# Patient Record
Sex: FEMALE | Race: WHITE | NOT HISPANIC OR LATINO | URBAN - METROPOLITAN AREA
[De-identification: names, ages, dates, MRNs, and addresses within clinical notes are randomized per-mention and may not be internally consistent; named-entity substitution may affect disease eponyms.]

---

## 2022-06-22 ENCOUNTER — ON CAMPUS - OUTPATIENT (OUTPATIENT)
Dept: URBAN - METROPOLITAN AREA HOSPITAL 2 | Facility: HOSPITAL | Age: 62
End: 2022-06-22
Payer: OTHER GOVERNMENT

## 2022-06-22 ENCOUNTER — OFFICE (OUTPATIENT)
Dept: URBAN - METROPOLITAN AREA PATHOLOGY 4 | Facility: PATHOLOGY | Age: 62
End: 2022-06-22
Payer: OTHER GOVERNMENT

## 2022-06-22 VITALS
RESPIRATION RATE: 18 BRPM | WEIGHT: 178.4 LBS | SYSTOLIC BLOOD PRESSURE: 103 MMHG | SYSTOLIC BLOOD PRESSURE: 124 MMHG | SYSTOLIC BLOOD PRESSURE: 118 MMHG | HEART RATE: 91 BPM | SYSTOLIC BLOOD PRESSURE: 127 MMHG | HEART RATE: 94 BPM | DIASTOLIC BLOOD PRESSURE: 108 MMHG | DIASTOLIC BLOOD PRESSURE: 78 MMHG | HEART RATE: 96 BPM | SYSTOLIC BLOOD PRESSURE: 151 MMHG | SYSTOLIC BLOOD PRESSURE: 155 MMHG | TEMPERATURE: 97.6 F | RESPIRATION RATE: 16 BRPM | HEART RATE: 99 BPM | OXYGEN SATURATION: 99 % | OXYGEN SATURATION: 95 % | DIASTOLIC BLOOD PRESSURE: 48 MMHG | DIASTOLIC BLOOD PRESSURE: 67 MMHG | HEART RATE: 85 BPM | HEART RATE: 114 BPM | DIASTOLIC BLOOD PRESSURE: 96 MMHG | HEART RATE: 86 BPM | OXYGEN SATURATION: 97 % | DIASTOLIC BLOOD PRESSURE: 52 MMHG | HEIGHT: 66 IN | OXYGEN SATURATION: 91 % | HEART RATE: 87 BPM | OXYGEN SATURATION: 93 % | SYSTOLIC BLOOD PRESSURE: 147 MMHG | HEART RATE: 95 BPM | SYSTOLIC BLOOD PRESSURE: 133 MMHG | DIASTOLIC BLOOD PRESSURE: 93 MMHG | DIASTOLIC BLOOD PRESSURE: 72 MMHG | DIASTOLIC BLOOD PRESSURE: 95 MMHG | OXYGEN SATURATION: 96 %

## 2022-06-22 DIAGNOSIS — K62.1 RECTAL POLYP: ICD-10-CM

## 2022-06-22 DIAGNOSIS — D17.79 BENIGN LIPOMATOUS NEOPLASM OF OTHER SITES: ICD-10-CM

## 2022-06-22 DIAGNOSIS — R19.7 DIARRHEA, UNSPECIFIED: ICD-10-CM

## 2022-06-22 DIAGNOSIS — D12.3 BENIGN NEOPLASM OF TRANSVERSE COLON: ICD-10-CM

## 2022-06-22 DIAGNOSIS — R19.4 CHANGE IN BOWEL HABIT: ICD-10-CM

## 2022-06-22 DIAGNOSIS — Z86.010 PERSONAL HISTORY OF COLONIC POLYPS: ICD-10-CM

## 2022-06-22 PROBLEM — K63.5 POLYP OF COLON: Status: ACTIVE | Noted: 2022-06-22

## 2022-06-22 PROBLEM — K63.89 OTHER SPECIFIED DISEASES OF INTESTINE: Status: ACTIVE | Noted: 2022-06-22

## 2022-06-22 LAB
GI HISTOLOGY: A. UNSPECIFIED: (no result)
GI HISTOLOGY: B. UNSPECIFIED: (no result)
GI HISTOLOGY: C. UNSPECIFIED: (no result)
GI HISTOLOGY: PDF REPORT: (no result)

## 2022-06-22 PROCEDURE — 88305 TISSUE EXAM BY PATHOLOGIST: CPT | Performed by: INTERNAL MEDICINE

## 2022-06-22 PROCEDURE — 45380 COLONOSCOPY AND BIOPSY: CPT | Mod: 59 | Performed by: INTERNAL MEDICINE

## 2022-06-22 PROCEDURE — 45385 COLONOSCOPY W/LESION REMOVAL: CPT | Performed by: INTERNAL MEDICINE

## 2022-07-11 ENCOUNTER — OFFICE (OUTPATIENT)
Dept: URBAN - METROPOLITAN AREA CLINIC 64 | Facility: CLINIC | Age: 62
End: 2022-07-11

## 2022-07-11 VITALS
HEIGHT: 66 IN | WEIGHT: 183 LBS | SYSTOLIC BLOOD PRESSURE: 150 MMHG | HEART RATE: 102 BPM | DIASTOLIC BLOOD PRESSURE: 89 MMHG

## 2022-07-11 DIAGNOSIS — R11.0 NAUSEA: ICD-10-CM

## 2022-07-11 DIAGNOSIS — K59.00 CONSTIPATION, UNSPECIFIED: ICD-10-CM

## 2022-07-11 DIAGNOSIS — R10.84 GENERALIZED ABDOMINAL PAIN: ICD-10-CM

## 2022-07-11 PROCEDURE — 99214 OFFICE O/P EST MOD 30 MIN: CPT | Performed by: NURSE PRACTITIONER

## 2022-09-16 RX ORDER — DICYCLOMINE HYDROCHLORIDE 10 MG/1
10 CAPSULE ORAL AS NEEDED
COMMUNITY

## 2022-09-16 RX ORDER — POLYETHYLENE GLYCOL 3350 17 G/17G
17 POWDER, FOR SOLUTION ORAL DAILY
COMMUNITY

## 2022-09-16 RX ORDER — PAROXETINE HYDROCHLORIDE 40 MG/1
40 TABLET, FILM COATED ORAL EVERY MORNING
COMMUNITY

## 2022-09-16 RX ORDER — LOSARTAN POTASSIUM 25 MG/1
25 TABLET ORAL DAILY
COMMUNITY

## 2022-09-16 RX ORDER — LEVOTHYROXINE SODIUM 0.15 MG/1
150 TABLET ORAL DAILY
COMMUNITY

## 2022-09-16 RX ORDER — CLOTRIMAZOLE 10 MG/1
10 LOZENGE ORAL; TOPICAL
COMMUNITY

## 2022-09-16 RX ORDER — ALBUTEROL SULFATE 90 UG/1
1 AEROSOL, METERED RESPIRATORY (INHALATION) AS NEEDED
COMMUNITY

## 2022-09-16 RX ORDER — NICOTINE 21 MG/24HR
1 PATCH, TRANSDERMAL 24 HOURS TRANSDERMAL EVERY 24 HOURS
COMMUNITY

## 2022-09-16 RX ORDER — IPRATROPIUM BROMIDE AND ALBUTEROL SULFATE 2.5; .5 MG/3ML; MG/3ML
3 SOLUTION RESPIRATORY (INHALATION) 3 TIMES DAILY PRN
COMMUNITY

## 2022-09-16 RX ORDER — CYCLOBENZAPRINE HCL 10 MG
10 TABLET ORAL AS NEEDED
COMMUNITY

## 2022-09-16 RX ORDER — BENZONATATE 100 MG/1
100 CAPSULE ORAL AS NEEDED
COMMUNITY

## 2022-09-16 RX ORDER — EVOLOCUMAB 140 MG/ML
140 INJECTION, SOLUTION SUBCUTANEOUS
COMMUNITY

## 2022-09-16 RX ORDER — ACYCLOVIR 400 MG/1
400 TABLET ORAL 2 TIMES DAILY PRN
COMMUNITY

## 2022-09-16 RX ORDER — ALPRAZOLAM 0.5 MG/1
0.5 TABLET ORAL NIGHTLY PRN
COMMUNITY

## 2022-09-16 RX ORDER — PANTOPRAZOLE SODIUM 40 MG/1
40 TABLET, DELAYED RELEASE ORAL AS NEEDED
COMMUNITY

## 2022-09-21 ENCOUNTER — ANESTHESIA EVENT (OUTPATIENT)
Dept: GASTROENTEROLOGY | Facility: HOSPITAL | Age: 62
End: 2022-09-21

## 2022-09-22 ENCOUNTER — ANESTHESIA (OUTPATIENT)
Dept: GASTROENTEROLOGY | Facility: HOSPITAL | Age: 62
End: 2022-09-22

## 2022-09-22 ENCOUNTER — HOSPITAL ENCOUNTER (OUTPATIENT)
Facility: HOSPITAL | Age: 62
Setting detail: HOSPITAL OUTPATIENT SURGERY
Discharge: HOME | End: 2022-09-22
Attending: INTERNAL MEDICINE | Admitting: INTERNAL MEDICINE

## 2022-09-22 ENCOUNTER — ON CAMPUS - OUTPATIENT (OUTPATIENT)
Dept: URBAN - METROPOLITAN AREA HOSPITAL 85 | Facility: HOSPITAL | Age: 62
End: 2022-09-22
Payer: OTHER GOVERNMENT

## 2022-09-22 VITALS
DIASTOLIC BLOOD PRESSURE: 68 MMHG | OXYGEN SATURATION: 98 % | SYSTOLIC BLOOD PRESSURE: 128 MMHG | HEIGHT: 66 IN | TEMPERATURE: 98 F | RESPIRATION RATE: 16 BRPM | WEIGHT: 181.88 LBS | BODY MASS INDEX: 29.23 KG/M2 | HEART RATE: 68 BPM

## 2022-09-22 DIAGNOSIS — R79.89 ELEVATED LFTS: ICD-10-CM

## 2022-09-22 DIAGNOSIS — K75.81 NONALCOHOLIC STEATOHEPATITIS (NASH): ICD-10-CM

## 2022-09-22 DIAGNOSIS — R10.9 UNSPECIFIED ABDOMINAL PAIN: ICD-10-CM

## 2022-09-22 DIAGNOSIS — R76.8 OTHER SPECIFIED ABNORMAL IMMUNOLOGICAL FINDINGS IN SERUM: ICD-10-CM

## 2022-09-22 DIAGNOSIS — R10.9 ABDOMINAL PAIN: ICD-10-CM

## 2022-09-22 DIAGNOSIS — R76.8 POSITIVE ANA (ANTINUCLEAR ANTIBODY): ICD-10-CM

## 2022-09-22 DIAGNOSIS — R74.01 ELEVATION OF LEVELS OF LIVER TRANSAMINASE LEVELS: ICD-10-CM

## 2022-09-22 LAB
INR PPP: 0.95 (ref 0.93–1.1)
PROTHROMBIN TIME: 9.8 SECONDS (ref 9.6–11.7)

## 2022-09-22 PROCEDURE — 94640 AIRWAY INHALATION TREATMENT: CPT

## 2022-09-22 PROCEDURE — 85610 PROTHROMBIN TIME: CPT | Performed by: INTERNAL MEDICINE

## 2022-09-22 PROCEDURE — 25010000002 PROPOFOL 10 MG/ML EMULSION: Performed by: ANESTHESIOLOGY

## 2022-09-22 PROCEDURE — 94799 UNLISTED PULMONARY SVC/PX: CPT

## 2022-09-22 PROCEDURE — 25010000002 PROPOFOL 200 MG/20ML EMULSION: Performed by: ANESTHESIOLOGY

## 2022-09-22 PROCEDURE — 25010000002 LEVOFLOXACIN PER 250 MG: Performed by: ANESTHESIOLOGY

## 2022-09-22 PROCEDURE — 43242 EGD US FINE NEEDLE BX/ASPIR: CPT | Performed by: INTERNAL MEDICINE

## 2022-09-22 PROCEDURE — 88307 TISSUE EXAM BY PATHOLOGIST: CPT | Performed by: INTERNAL MEDICINE

## 2022-09-22 PROCEDURE — 88313 SPECIAL STAINS GROUP 2: CPT | Performed by: INTERNAL MEDICINE

## 2022-09-22 RX ORDER — SODIUM CHLORIDE 0.9 % (FLUSH) 0.9 %
10 SYRINGE (ML) INJECTION EVERY 12 HOURS SCHEDULED
Status: DISCONTINUED | OUTPATIENT
Start: 2022-09-22 | End: 2022-09-22 | Stop reason: HOSPADM

## 2022-09-22 RX ORDER — MIDAZOLAM HYDROCHLORIDE 1 MG/ML
1 INJECTION INTRAMUSCULAR; INTRAVENOUS
Status: DISCONTINUED | OUTPATIENT
Start: 2022-09-22 | End: 2022-09-22 | Stop reason: HOSPADM

## 2022-09-22 RX ORDER — PROPOFOL 10 MG/ML
INJECTION, EMULSION INTRAVENOUS AS NEEDED
Status: DISCONTINUED | OUTPATIENT
Start: 2022-09-22 | End: 2022-09-22 | Stop reason: SURG

## 2022-09-22 RX ORDER — LEVOFLOXACIN 5 MG/ML
INJECTION, SOLUTION INTRAVENOUS
Status: COMPLETED
Start: 2022-09-22 | End: 2022-09-22

## 2022-09-22 RX ORDER — LEVOFLOXACIN 5 MG/ML
INJECTION, SOLUTION INTRAVENOUS AS NEEDED
Status: DISCONTINUED | OUTPATIENT
Start: 2022-09-22 | End: 2022-09-22 | Stop reason: SURG

## 2022-09-22 RX ORDER — SODIUM CHLORIDE 9 MG/ML
INJECTION, SOLUTION INTRAVENOUS CONTINUOUS PRN
Status: DISCONTINUED | OUTPATIENT
Start: 2022-09-22 | End: 2022-09-22 | Stop reason: SURG

## 2022-09-22 RX ORDER — METRONIDAZOLE 500 MG/100ML
INJECTION, SOLUTION INTRAVENOUS AS NEEDED
Status: DISCONTINUED | OUTPATIENT
Start: 2022-09-22 | End: 2022-09-22 | Stop reason: SURG

## 2022-09-22 RX ORDER — HEPARIN SODIUM (PORCINE) LOCK FLUSH IV SOLN 100 UNIT/ML 100 UNIT/ML
SOLUTION INTRAVENOUS
Status: DISCONTINUED
Start: 2022-09-22 | End: 2022-09-22 | Stop reason: HOSPADM

## 2022-09-22 RX ORDER — SODIUM CHLORIDE 9 MG/ML
9 INJECTION, SOLUTION INTRAVENOUS CONTINUOUS PRN
Status: DISCONTINUED | OUTPATIENT
Start: 2022-09-22 | End: 2022-09-22 | Stop reason: HOSPADM

## 2022-09-22 RX ORDER — SODIUM CHLORIDE 0.9 % (FLUSH) 0.9 %
10 SYRINGE (ML) INJECTION AS NEEDED
Status: DISCONTINUED | OUTPATIENT
Start: 2022-09-22 | End: 2022-09-22 | Stop reason: HOSPADM

## 2022-09-22 RX ORDER — IPRATROPIUM BROMIDE AND ALBUTEROL SULFATE 2.5; .5 MG/3ML; MG/3ML
3 SOLUTION RESPIRATORY (INHALATION)
Status: DISCONTINUED | OUTPATIENT
Start: 2022-09-22 | End: 2022-09-22 | Stop reason: HOSPADM

## 2022-09-22 RX ORDER — METRONIDAZOLE 500 MG/100ML
INJECTION, SOLUTION INTRAVENOUS
Status: COMPLETED
Start: 2022-09-22 | End: 2022-09-22

## 2022-09-22 RX ADMIN — SODIUM CHLORIDE: 0.9 INJECTION, SOLUTION INTRAVENOUS at 10:44

## 2022-09-22 RX ADMIN — PROPOFOL 100 MG: 10 INJECTION, EMULSION INTRAVENOUS at 10:48

## 2022-09-22 RX ADMIN — METRONIDAZOLE 500 MG: 500 INJECTION, SOLUTION INTRAVENOUS at 10:51

## 2022-09-22 RX ADMIN — IPRATROPIUM BROMIDE AND ALBUTEROL SULFATE 3 ML: .5; 3 SOLUTION RESPIRATORY (INHALATION) at 12:06

## 2022-09-22 RX ADMIN — PROPOFOL 100 MCG/KG/MIN: 10 INJECTION, EMULSION INTRAVENOUS at 10:48

## 2022-09-22 RX ADMIN — LEVOFLOXACIN 500 MG: 500 INJECTION, SOLUTION INTRAVENOUS at 10:56

## 2022-09-22 NOTE — OP NOTE
ESOPHAGOGASTRODUODENOSCOPY WITH ULTRASOUND AND FINE NEEDLE ASPIRATION Procedure Report    Patient Name:  Earnestine Juan  YOB: 1960    Date of Surgery:  9/22/2022     Pre-Op Diagnosis:  Elevated LFTs [R79.89]  Positive GARY (antinuclear antibody) [R76.8]  Abdominal pain [R10.9]       Post-Op Diagnosis Codes:     * Elevated LFTs [R79.89]     * Positive GARY (antinuclear antibody) [R76.8]     * Abdominal pain [R10.9]      Procedure/CPT® Codes:      Procedure(s):  esophagogastroduodenaoscopy with endoscopic ultrasound GUIDED LIVER BIOPSY    Staff:  Surgeon(s):  Miesha Muñiz MD      Anesthesia: Monitored Anesthesia Care    Description of Procedure:  A description of the procedure as well as risks, benefits and alternative methods were explained to the patient who voiced understanding and signed the corresponding consent form. A physical exam was performed and vital signs were monitored throughout the procedure.    An upper GI endoscope was placed into the mouth and proceeded through the esophagus, stomach and second portion of the duodenum without difficulty. The scope was then retroflexed and the fundus was visualized. The procedure was not difficult and there were no immediate complications.    A pentex linear echoendoscope was advanced into the mouth, esophagus, and into the stomach. The celiac axis was visualized, next the scope was used to visualize the pancreatic neck, body, and tail as well as the L lobe of the liver. The scope was advanced into the duodenal bulb where the pancreatic head and ampulla were visualized as well as the biliary tree and R lobe of the liver. The scope was positioned and doppler was used to find an adequate spot for a liver biopsy. A 19g FNB needle was advanced into the R lobe of the liver using a modified wet suction technique with the syringe prefilled with heparin. A single puncture of the liver capsule was done and one actuation of the needle was done once the liver  capsule was breached. Adequate specimen was obtained.    Next the scope was withdrawn into the stomach and the L lobe of the liver was visualized. The scope was positioned and doppler was used to find an adequate spot for a liver biopsy. A 19g FNB needle was advanced into the L lobe of the liver using a modified wet suction technique with the syringe prefilled with heparin. A single puncture of the liver capsule was done and one actuation of the needle was done once the liver capsule was breached. Adequate specimen was obtained.    Impression:  1. Successful EUS guided liver bx using a 19g FNB needle.  2. Normal pancreatic EUS  3. Normal celiac axis  4. Normal eosphageal mucosa entire esophagus  5. Normal gastric mucosa entire stomach      Recommendations:  1. No NSAIDs or blood thinners for 7 days.   2. Follow up bx results   3. Follow up in clinic with NP in 2-4 weeks to discuss results.   4.  Follow-up with Jesi.  Patient will be advised to hold off her multivitamin at least for 10 days.  She will also be advised to hold off on her herbal supplement from liver doctor online for 10 days      Miesha Muñiz MD     Date: 9/22/2022    Time: 11:12 EDT

## 2022-09-22 NOTE — ANESTHESIA POSTPROCEDURE EVALUATION
Patient: Earnestine Juan    Procedure Summary     Date: 09/22/22 Room / Location: [facility] ENDOSCOPY 2 / [facility] ENDOSCOPY    Anesthesia Start: 1044 Anesthesia Stop: 1111    Procedure: esophagogastroduodenaoscopy with endoscopic ultrasound GUIDED LIVER BIOPSY (N/A ) Diagnosis:       Elevated LFTs      Positive GARY (antinuclear antibody)      Abdominal pain      (Elevated LFTs [R79.89])      (Positive GARY (antinuclear antibody) [R76.8])      (Abdominal pain [R10.9])    Surgeons: Miesha Muñiz MD Provider: Benjamin Schumacher MD    Anesthesia Type: MAC ASA Status: 3          Anesthesia Type: MAC    Vitals  No vitals data found for the desired time range.          Post Anesthesia Care and Evaluation    Patient location during evaluation: PACU  Patient participation: complete - patient participated  Level of consciousness: awake  Pain scale: See nurse's notes for pain score.  Pain management: adequate    Airway patency: patent  Anesthetic complications: No anesthetic complications  PONV Status: none  Cardiovascular status: acceptable  Respiratory status: acceptable  Hydration status: acceptable    Comments: Patient seen and examined postoperatively; vital signs stable; SpO2 greater than or equal to 90%; cardiopulmonary status stable; nausea/vomiting adequately controlled; pain adequately controlled; no apparent anesthesia complications; patient discharged from anesthesia care when discharge criteria were met

## 2022-09-22 NOTE — ANESTHESIA PREPROCEDURE EVALUATION
Anesthesia Evaluation     Patient summary reviewed and Nursing notes reviewed   history of anesthetic complications: PONV  NPO Solid Status: > 8 hours  NPO Liquid Status: > 8 hours           Airway   Mallampati: II  TM distance: >3 FB  Neck ROM: full  No difficulty expected  Dental - normal exam     Pulmonary - negative pulmonary ROS and normal exam   Cardiovascular - normal exam    (+) hypertension, hyperlipidemia,       Neuro/Psych  (+) psychiatric history Depression,    GI/Hepatic/Renal/Endo - negative ROS     Musculoskeletal     Abdominal  - normal exam    Bowel sounds: normal.   Substance History - negative use     OB/GYN negative ob/gyn ROS         Other   arthritis,    history of cancer                    Anesthesia Plan    ASA 3     MAC   total IV anesthesia  intravenous induction     Anesthetic plan, risks, benefits, and alternatives have been provided, discussed and informed consent has been obtained with: patient.  Pre-procedure education provided      CODE STATUS:

## 2022-09-22 NOTE — DISCHARGE INSTRUCTIONS
A responsible adult should stay with you and you should rest quietly for the rest of the day.    Do not drink alcohol, drive, operate any heavy machinery or power tools or make any legal/important decisions for the next 24 hours.     Progress your diet as tolerated.  If you begin to experience severe pain, increased shortness of breath, racing heartbeat or a fever above 101 F, seek immediate medical attention.     Follow up with MD as instructed. Call office for results in 3 to 5 days if needed.     [phone]  Impression:  1. Successful EUS guided liver bx using a 19g FNB needle.  2. Normal pancreatic EUS  3. Normal celiac axis  4. Normal eosphageal mucosa entire esophagus  5. Normal gastric mucosa entire stomach        Recommendations:  1. No NSAIDs or blood thinners for 7 days.   2. Follow up bx results   3. Follow up in clinic with NP in 2-4 weeks to discuss results.   4.  Follow-up with Jesi.  Patient will be advised to hold off her multivitamin at least for 10 days.  She will also be advised to hold off on her herbal supplement from liver doctor online for 10 days

## 2022-09-22 NOTE — H&P
Patient Care Team:  Lawanda Burgess MD as PCP - General (Family Medicine)      Subjective .     History of present illness:    Earnestine Juan is a 62 y.o. female who presents today for Procedure(s):  esophagogastroduodenaoscopy with endoscopic ultrasound GUIDED LIVER BIOPSY for the indications listed below.     The updated Patient Profile was reviewed prior to the procedure, in conjunction with the Physical Exam, including medical conditions, surgical procedures, medications, allergies, family history and social history.     Pre-operatively, I reviewed the indication(s) for the procedure, the risks of the procedure [including but not limited to: unexpected bleeding possibly requiring hospitalization and/or unplanned repeat procedures, perforation possibly requiring surgical treatment, missed lesions and complications of sedation/MAC (also explained by anesthesia staff)].     I have evaluated the patient for risks associated with the planned anesthesia and the procedure to be performed and find the patient an acceptable candidate for IV sedation.    Multiple opportunities were provided for any questions or concerns, and all questions were answered satisfactorily before any anesthesia was administered. We will proceed with the planned procedure.      ASSESSMENT/PLAN:    62 years old female with a history of elevated LFTs PBC autoimmune hepatitis performing a liver biopsy to rule out overlap syndrome  EGD  EUS      Past Medical History:  Past Medical History:   Diagnosis Date   • Abdominal pain    • Anxiety    • B12 deficiency    • Back ache     SPASMS   • Cancer (HCC)     BREAST SURGERY AND CHEMO   • DDD (degenerative disc disease), lumbosacral    • Depression    • Gallbladder anomaly     LOW EJECTION FRACTION   • Genital herpes    • History of ITP     WITH PENICILLIN   • Hyperlipidemia    • Hypertension    • IBS (irritable bowel syndrome)    • Insomnia    • Pernicious anemia    • PONV (postoperative nausea and  vomiting)     POST COLONOSCOPY   • Pulmonary fibrosis (HCC)     BEING EVALUATED FOR   • Rheumatoid arthritis (HCC)        Past Surgical History:  Past Surgical History:   Procedure Laterality Date   • COLONOSCOPY     • FINGER/THUMB ARTHROPLASTY     • JOINT REPLACEMENT Bilateral     KNEES   • MASTECTOMY     • SKIN CANCER EXCISION Right     MELANOMA LOWER LEG   • TOE FUSION         Social History:  Social History     Tobacco Use   • Smoking status: Current Every Day Smoker     Packs/day: 0.25     Types: Cigarettes   • Smokeless tobacco: Never Used   Vaping Use   • Vaping Use: Never used   Substance Use Topics   • Alcohol use: Not Currently   • Drug use: Not Currently       Family History:  History reviewed. No pertinent family history.    Medications:  Medications Prior to Admission   Medication Sig Dispense Refill Last Dose   • acyclovir (ZOVIRAX) 400 MG tablet Take 400 mg by mouth 2 (Two) Times a Day As Needed. Take no more than 5 doses a day. NOT ON CURRENTLY   9/21/2022 at Unknown time   • albuterol sulfate  (90 Base) MCG/ACT inhaler Inhale 1 puff As Needed for Wheezing.   9/21/2022 at Unknown time   • ALPRAZolam (XANAX) 0.5 MG tablet Take 0.5 mg by mouth At Night As Needed for Anxiety.   9/21/2022 at Unknown time   • benzonatate (TESSALON) 100 MG capsule Take 100 mg by mouth As Needed for Cough. HASNT NEEDED TO TAKE   9/21/2022 at Unknown time   • clotrimazole (MYCELEX) 10 MG jerry Take 10 mg by mouth 5 (Five) Times a Day As Needed. THRUSH NOT ON CURRENTLY   9/21/2022 at Unknown time   • cyclobenzaprine (FLEXERIL) 10 MG tablet Take 10 mg by mouth As Needed for Muscle Spasms.   9/21/2022 at Unknown time   • dicyclomine (BENTYL) 10 MG capsule Take 10 mg by mouth As Needed.   9/21/2022 at Unknown time   • Evolocumab (Repatha) solution prefilled syringe injection Inject 140 mg under the skin into the appropriate area as directed Every 14 (Fourteen) Days.   9/21/2022 at Unknown time   •  Fluticasone-Umeclidin-Vilant (Trelegy Ellipta) 200-62.5-25 MCG/INH inhaler Inhale 1 puff Daily.   9/22/2022 at Unknown time   • ipratropium-albuterol (DUO-NEB) 0.5-2.5 mg/3 ml nebulizer Take 3 mL by nebulization 3 (Three) Times a Day As Needed for Wheezing.   9/21/2022 at Unknown time   • levothyroxine (SYNTHROID, LEVOTHROID) 150 MCG tablet Take 150 mcg by mouth Daily.   9/22/2022 at Unknown time   • losartan (COZAAR) 25 MG tablet Take 25 mg by mouth Daily.   9/22/2022 at Unknown time   • nicotine (NICODERM CQ) 21 MG/24HR patch Place 1 patch on the skin as directed by provider Daily. WONT PLACE NEW IN AM OF PROCEDURE   9/21/2022 at Unknown time   • NON FORMULARY Take 1 dose by mouth 2 (Two) Times a Day. LIVATONE PLUS   9/21/2022 at Unknown time   • pantoprazole (PROTONIX) 40 MG EC tablet Take 40 mg by mouth As Needed. NOT CURRENTLY TAKING   9/21/2022 at Unknown time   • PARoxetine (PAXIL) 40 MG tablet Take 40 mg by mouth Every Morning.   9/22/2022 at Unknown time   • polyethylene glycol (MIRALAX) 17 GM/SCOOP powder Take 17 g by mouth Daily.   9/21/2022 at Unknown time   • Probiotic Product (PROBIOTIC-10 PO) Take 1 dose by mouth Daily.   9/21/2022 at Unknown time   • Wheat Dextrin (BENEFIBER PO) Take 1 dose by mouth 2 (Two) Times a Day.   9/21/2022 at Unknown time       Scheduled Meds:heparin, , ,       Continuous Infusions:sodium chloride, 9 mL/hr      PRN Meds:.sodium chloride    ALLERGIES:  Breo ellipta [fluticasone furoate-vilanterol], Codeine, Penicillins, and Statins        Objective     Vital Signs:   Temp:  [98.4 °F (36.9 °C)] 98.4 °F (36.9 °C)  Heart Rate:  [78] 78  Resp:  [18] 18  BP: (132)/(64) 132/64    Physical Exam:      General Appearance:    Awake and alert, in no acute distress   Lungs:     Clear to auscultation bilaterally, respirations regular, even and unlabored    Heart:    Regular rhythm and normal rate, normal S1 and S2, no            murmur, no gallop, no rub   Abdomen:     Normal bowel  sounds, soft, non-tender, no rebound or guarding, non-distended, no hepatosplenomegaly        Results Review:   I reviewed the patient's labs and imaging.  Lab Results (last 24 hours)     Procedure Component Value Units Date/Time    Protime-INR [[id number]]  (Normal) Collected: 09/22/22 0937    Specimen: Blood from Arm, Right Updated: 09/22/22 1012     Protime 9.8 Seconds      INR 0.95          Imaging Results (Last 24 Hours)     ** No results found for the last 24 hours. **             I discussed the patients findings and my recommendations with the patient.  Miesha Muñiz MD  09/22/22  11:12 EDT

## 2022-09-27 ENCOUNTER — OFFICE (OUTPATIENT)
Dept: URBAN - METROPOLITAN AREA CLINIC 64 | Facility: CLINIC | Age: 62
End: 2022-09-27

## 2022-09-27 VITALS
SYSTOLIC BLOOD PRESSURE: 122 MMHG | HEIGHT: 66 IN | DIASTOLIC BLOOD PRESSURE: 83 MMHG | WEIGHT: 183 LBS | HEART RATE: 94 BPM

## 2022-09-27 DIAGNOSIS — R74.8 ABNORMAL LEVELS OF OTHER SERUM ENZYMES: ICD-10-CM

## 2022-09-27 DIAGNOSIS — R11.0 NAUSEA: ICD-10-CM

## 2022-09-27 DIAGNOSIS — R76.8 OTHER SPECIFIED ABNORMAL IMMUNOLOGICAL FINDINGS IN SERUM: ICD-10-CM

## 2022-09-27 DIAGNOSIS — R10.84 GENERALIZED ABDOMINAL PAIN: ICD-10-CM

## 2022-09-27 PROCEDURE — 99213 OFFICE O/P EST LOW 20 MIN: CPT | Performed by: NURSE PRACTITIONER

## 2022-10-18 LAB
LAB AP CASE REPORT: NORMAL
PATH REPORT.FINAL DX SPEC: NORMAL
PATH REPORT.GROSS SPEC: NORMAL

## 2022-12-15 ENCOUNTER — OFFICE (OUTPATIENT)
Dept: URBAN - METROPOLITAN AREA CLINIC 64 | Facility: CLINIC | Age: 62
End: 2022-12-15

## 2022-12-15 DIAGNOSIS — R76.8 OTHER SPECIFIED ABNORMAL IMMUNOLOGICAL FINDINGS IN SERUM: ICD-10-CM

## 2022-12-15 DIAGNOSIS — R74.8 ABNORMAL LEVELS OF OTHER SERUM ENZYMES: ICD-10-CM

## 2022-12-15 DIAGNOSIS — R94.5 ABNORMAL RESULTS OF LIVER FUNCTION STUDIES: ICD-10-CM

## 2022-12-15 PROCEDURE — 99213 OFFICE O/P EST LOW 20 MIN: CPT | Performed by: NURSE PRACTITIONER

## 2023-03-01 VITALS — HEIGHT: 66 IN | SYSTOLIC BLOOD PRESSURE: 133 MMHG | HEART RATE: 93 BPM | DIASTOLIC BLOOD PRESSURE: 89 MMHG

## 2023-03-27 ENCOUNTER — OFFICE (OUTPATIENT)
Dept: URBAN - METROPOLITAN AREA CLINIC 64 | Facility: CLINIC | Age: 63
End: 2023-03-27

## 2023-03-27 VITALS
SYSTOLIC BLOOD PRESSURE: 129 MMHG | WEIGHT: 181 LBS | HEIGHT: 66 IN | DIASTOLIC BLOOD PRESSURE: 93 MMHG | HEART RATE: 88 BPM

## 2023-03-27 DIAGNOSIS — R53.83 OTHER FATIGUE: ICD-10-CM

## 2023-03-27 DIAGNOSIS — R94.5 ABNORMAL RESULTS OF LIVER FUNCTION STUDIES: ICD-10-CM

## 2023-03-27 DIAGNOSIS — K59.00 CONSTIPATION, UNSPECIFIED: ICD-10-CM

## 2023-03-27 DIAGNOSIS — R11.0 NAUSEA: ICD-10-CM

## 2023-03-27 PROCEDURE — 99214 OFFICE O/P EST MOD 30 MIN: CPT | Performed by: INTERNAL MEDICINE

## 2023-03-27 RX ORDER — OMEPRAZOLE 40 MG/1
40 CAPSULE, DELAYED RELEASE ORAL
Qty: 90 | Refills: 3 | Status: ACTIVE
Start: 2023-03-27

## 2023-03-27 RX ORDER — URSODIOL 300 MG/1
CAPSULE ORAL
Qty: 120 | Refills: 3 | Status: ACTIVE

## 2023-03-30 LAB
LAB AP CASE REPORT: NORMAL
LAB AP DIAGNOSIS COMMENT: NORMAL
Lab: NORMAL
PATH REPORT.ADDENDUM SPEC: NORMAL
PATH REPORT.FINAL DX SPEC: NORMAL
PATH REPORT.GROSS SPEC: NORMAL

## 2023-06-01 ENCOUNTER — OFFICE (OUTPATIENT)
Dept: URBAN - METROPOLITAN AREA CLINIC 64 | Facility: CLINIC | Age: 63
End: 2023-06-01

## 2023-06-01 VITALS
DIASTOLIC BLOOD PRESSURE: 76 MMHG | HEART RATE: 86 BPM | WEIGHT: 177 LBS | SYSTOLIC BLOOD PRESSURE: 127 MMHG | HEIGHT: 66 IN

## 2023-06-01 DIAGNOSIS — R76.8 OTHER SPECIFIED ABNORMAL IMMUNOLOGICAL FINDINGS IN SERUM: ICD-10-CM

## 2023-06-01 DIAGNOSIS — R94.5 ABNORMAL RESULTS OF LIVER FUNCTION STUDIES: ICD-10-CM

## 2023-06-01 DIAGNOSIS — L29.9 PRURITUS, UNSPECIFIED: ICD-10-CM

## 2023-06-01 PROCEDURE — 99214 OFFICE O/P EST MOD 30 MIN: CPT | Performed by: INTERNAL MEDICINE

## 2023-06-01 RX ORDER — HYDROXYZINE HYDROCHLORIDE 25 MG/1
TABLET, FILM COATED ORAL
Qty: 60 | Refills: 5 | Status: ACTIVE
Start: 2023-06-01

## 2023-10-06 ENCOUNTER — OFFICE (OUTPATIENT)
Dept: URBAN - METROPOLITAN AREA CLINIC 64 | Facility: CLINIC | Age: 63
End: 2023-10-06

## 2023-10-06 VITALS
DIASTOLIC BLOOD PRESSURE: 68 MMHG | HEART RATE: 91 BPM | SYSTOLIC BLOOD PRESSURE: 115 MMHG | HEIGHT: 66 IN | WEIGHT: 185 LBS

## 2023-10-06 DIAGNOSIS — R07.9 CHEST PAIN, UNSPECIFIED: ICD-10-CM

## 2023-10-06 DIAGNOSIS — R94.5 ABNORMAL RESULTS OF LIVER FUNCTION STUDIES: ICD-10-CM

## 2023-10-06 DIAGNOSIS — K59.00 CONSTIPATION, UNSPECIFIED: ICD-10-CM

## 2023-10-06 DIAGNOSIS — R13.10 DYSPHAGIA, UNSPECIFIED: ICD-10-CM

## 2023-10-06 PROCEDURE — 99214 OFFICE O/P EST MOD 30 MIN: CPT | Performed by: INTERNAL MEDICINE

## 2024-02-12 ENCOUNTER — ON CAMPUS - OUTPATIENT (OUTPATIENT)
Dept: URBAN - METROPOLITAN AREA HOSPITAL 77 | Facility: HOSPITAL | Age: 64
End: 2024-02-12

## 2024-02-12 DIAGNOSIS — K31.89 OTHER DISEASES OF STOMACH AND DUODENUM: ICD-10-CM

## 2024-02-12 DIAGNOSIS — B37.81 CANDIDAL ESOPHAGITIS: ICD-10-CM

## 2024-02-12 DIAGNOSIS — K44.9 DIAPHRAGMATIC HERNIA WITHOUT OBSTRUCTION OR GANGRENE: ICD-10-CM

## 2024-02-12 DIAGNOSIS — R13.10 DYSPHAGIA, UNSPECIFIED: ICD-10-CM

## 2024-02-12 DIAGNOSIS — K22.2 ESOPHAGEAL OBSTRUCTION: ICD-10-CM

## 2024-02-12 PROCEDURE — 43450 DILATE ESOPHAGUS 1/MULT PASS: CPT | Performed by: INTERNAL MEDICINE

## 2024-02-12 PROCEDURE — 43235 EGD DIAGNOSTIC BRUSH WASH: CPT | Performed by: INTERNAL MEDICINE

## 2024-07-23 ENCOUNTER — TRANSCRIBE ORDERS (OUTPATIENT)
Dept: ADMINISTRATIVE | Facility: HOSPITAL | Age: 64
End: 2024-07-23
Payer: OTHER GOVERNMENT

## 2024-07-23 DIAGNOSIS — J84.9 ILD (INTERSTITIAL LUNG DISEASE): Primary | ICD-10-CM

## 2024-08-13 ENCOUNTER — HOSPITAL ENCOUNTER (OUTPATIENT)
Facility: HOSPITAL | Age: 64
Discharge: HOME | End: 2024-08-13
Admitting: INTERNAL MEDICINE
Payer: MEDICARE

## 2024-08-13 DIAGNOSIS — J84.9 ILD (INTERSTITIAL LUNG DISEASE): ICD-10-CM

## 2024-08-13 PROCEDURE — 71250 CT THORAX DX C-: CPT

## 2025-01-27 ENCOUNTER — APPOINTMENT (OUTPATIENT)
Dept: GENERAL RADIOLOGY | Facility: HOSPITAL | Age: 65
End: 2025-01-27
Payer: MEDICARE

## 2025-01-27 ENCOUNTER — HOSPITAL ENCOUNTER (EMERGENCY)
Facility: HOSPITAL | Age: 65
Discharge: HOME | End: 2025-01-28
Attending: EMERGENCY MEDICINE
Payer: MEDICARE

## 2025-01-27 DIAGNOSIS — S81.011A KNEE LACERATION, RIGHT, INITIAL ENCOUNTER: Primary | ICD-10-CM

## 2025-01-27 PROCEDURE — 73560 X-RAY EXAM OF KNEE 1 OR 2: CPT

## 2025-01-27 PROCEDURE — 25010000002 LIDOCAINE 1% - EPINEPHRINE 1:100000 1 %-1:100000 SOLUTION

## 2025-01-27 PROCEDURE — 99283 EMERGENCY DEPT VISIT LOW MDM: CPT

## 2025-01-27 RX ORDER — LIDOCAINE HYDROCHLORIDE AND EPINEPHRINE 10; 10 MG/ML; UG/ML
10 INJECTION, SOLUTION INFILTRATION; PERINEURAL ONCE
Status: COMPLETED | OUTPATIENT
Start: 2025-01-27 | End: 2025-01-28

## 2025-01-28 VITALS
SYSTOLIC BLOOD PRESSURE: 135 MMHG | WEIGHT: 180 LBS | TEMPERATURE: 97 F | HEIGHT: 66 IN | RESPIRATION RATE: 16 BRPM | HEART RATE: 98 BPM | DIASTOLIC BLOOD PRESSURE: 60 MMHG | OXYGEN SATURATION: 96 % | BODY MASS INDEX: 28.93 KG/M2

## 2025-01-28 PROCEDURE — 25010000002 TETANUS-DIPHTH-ACELL PERTUSSIS 5-2.5-18.5 LF-MCG/0.5 SUSPENSION PREFILLED SYRINGE: Performed by: PHYSICIAN ASSISTANT

## 2025-01-28 PROCEDURE — 90715 TDAP VACCINE 7 YRS/> IM: CPT | Performed by: PHYSICIAN ASSISTANT

## 2025-01-28 PROCEDURE — 90471 IMMUNIZATION ADMIN: CPT | Performed by: PHYSICIAN ASSISTANT

## 2025-01-28 PROCEDURE — 25010000002 LIDOCAINE 1% - EPINEPHRINE 1:100000 1 %-1:100000 SOLUTION: Performed by: PHYSICIAN ASSISTANT

## 2025-01-28 RX ADMIN — LIDOCAINE HYDROCHLORIDE,EPINEPHRINE BITARTRATE 10 ML: 10; .01 INJECTION, SOLUTION INFILTRATION; PERINEURAL at 00:01

## 2025-01-28 RX ADMIN — TETANUS TOXOID, REDUCED DIPHTHERIA TOXOID AND ACELLULAR PERTUSSIS VACCINE, ADSORBED 0.5 ML: 5; 2.5; 8; 8; 2.5 SUSPENSION INTRAMUSCULAR at 00:00

## 2025-01-28 NOTE — ED PROVIDER NOTES
EMERGENCY DEPARTMENT ENCOUNTER      PCP: Lawanda Burgess MD  Patient Care Team:  Lawanda Burgess MD as PCP - General (Family Medicine)   Independent Historians: Patient, friend at bedside    HPI:  Chief Complaint: Fall, knee injury  A complete HPI/ROS/PMH/PSH/SH/FH are unobtainable due to: None    Chronic or social conditions impacting patient care (social determinants of health): None    Context: Maribel Juan is a 64 y.o. female who presents to the ED c/o acute right knee laceration after slipping on ice.  She reports hitting her knee on concrete.  She has been able to ambulate.  She denies hitting her head or loss of consciousness.  She does not take any blood thinners.  She is unsure if her tetanus shot is up-to-date.    Review of prior external notes and/or external test results outside of this encounter: CMP on 1/15/2023 showed normal creatinine of 0.88.  Hemoglobin A1c on 8/29/2024 was 5.9.  CBC on 12/2/2024 showed normal hemoglobin of 13.5.      PAST MEDICAL HISTORY  Active Ambulatory Problems     Diagnosis Date Noted    No Active Ambulatory Problems     Resolved Ambulatory Problems     Diagnosis Date Noted    No Resolved Ambulatory Problems     Past Medical History:   Diagnosis Date    Abdominal pain     Anxiety     B12 deficiency     Back ache     Cancer     DDD (degenerative disc disease), lumbosacral     Depression     Gallbladder anomaly     Genital herpes     History of ITP     Hyperlipidemia     Hypertension     IBS (irritable bowel syndrome)     Insomnia     Pernicious anemia     PONV (postoperative nausea and vomiting)     Pulmonary fibrosis     Rheumatoid arthritis        The patient has started, but not completed, their COVID-19 vaccination series.    PAST SURGICAL HISTORY  Past Surgical History:   Procedure Laterality Date    COLONOSCOPY      FINGER/THUMB ARTHROPLASTY      JOINT REPLACEMENT Bilateral     KNEES    MASTECTOMY      SKIN CANCER EXCISION Right     MELANOMA LOWER LEG    TOE  FUSION      UPPER ENDOSCOPIC ULTRASOUND W/ FNA N/A 9/22/2022    Procedure: esophagogastroduodenaoscopy with endoscopic ultrasound GUIDED LIVER BIOPSY;  Surgeon: Miesha Muñiz MD;  Location: [facility] ENDOSCOPY;  Service: Gastroenterology;  Laterality: N/A;  post:         FAMILY HISTORY  No family history on file.      SOCIAL HISTORY  Social History     Socioeconomic History    Marital status:    Tobacco Use    Smoking status: Every Day     Current packs/day: 0.25     Types: Cigarettes    Smokeless tobacco: Never   Vaping Use    Vaping status: Never Used   Substance and Sexual Activity    Alcohol use: Not Currently    Drug use: Not Currently    Sexual activity: Defer         ALLERGIES  Breo ellipta [fluticasone furoate-vilanterol], Codeine, Penicillins, and Statins        REVIEW OF SYSTEMS  Review of Systems   Skin:  Positive for wound (laceration R knee).   Neurological:  Negative for weakness and numbness.        All systems reviewed and negative except for those discussed in HPI.       PHYSICAL EXAM    I have reviewed the triage vital signs and nursing notes.    ED Triage Vitals   Temp Heart Rate Resp BP SpO2   01/27/25 2146 01/27/25 2146 01/27/25 2146 01/27/25 2152 01/27/25 2146   97 °F (36.1 °C) 105 18 140/64 93 %      Temp src Heart Rate Source Patient Position BP Location FiO2 (%)   01/27/25 2146 01/27/25 2146 01/27/25 2152 01/27/25 2152 --   Tympanic Monitor Sitting Right arm        Physical Exam  GENERAL: alert, no acute distress  SKIN: Warm, dry, large horizontal laceration overlying the R patella, no visualized fb or tendon laceration, smaller laceration to the medial aspect just inferior to larger laceration  HENT: Normocephalic, atraumatic  EYES: no scleral icterus  CV: regular rhythm, regular rate  RESPIRATORY: normal effort, lungs clear  ABDOMEN: soft, nontender, nondistended  MUSCULOSKELETAL: no bony tenderness of the RLE, normal strength and ROM of R knee, no deformity  NEURO: alert, moves all  extremities, follows commands          LAB RESULTS  No results found for this or any previous visit (from the past 24 hours).    Ordered the above labs and independently reviewed and interpreted the results.        RADIOLOGY  No Radiology Exams Resulted Within Past 24 Hours    I ordered the above noted radiological studies. Independently reviewed and interpreted by me.  See dictation for official radiology interpretation.      PROCEDURES    Laceration Repair    Date/Time: 1/27/2025 11:46 PM    Performed by: Wen Razo PA  Authorized by: Edison Lund MD    Consent:     Consent obtained:  Verbal    Consent given by:  Patient    Risks, benefits, and alternatives were discussed: yes      Risks discussed:  Infection and pain  Universal protocol:     Procedure explained and questions answered to patient or proxy's satisfaction: yes      Imaging studies available: yes      Immediately prior to procedure, a time out was called: yes      Patient identity confirmed:  Verbally with patient  Anesthesia:     Anesthesia method:  Local infiltration    Local anesthetic:  Lidocaine 1% WITH epi  Laceration details:     Location:  Leg    Leg location:  R knee    Length (cm):  10  Pre-procedure details:     Preparation:  Patient was prepped and draped in usual sterile fashion and imaging obtained to evaluate for foreign bodies  Exploration:     Hemostasis achieved with:  Direct pressure and epinephrine    Imaging obtained: x-ray      Imaging outcome: foreign body not noted      Wound exploration: wound explored through full range of motion and entire depth of wound visualized      Wound extent: no foreign bodies/material noted, no tendon damage noted and no underlying fracture noted    Treatment:     Area cleansed with:  Chlorhexidine and saline    Amount of cleaning:  Standard    Irrigation solution:  Sterile saline  Skin repair:     Repair method:  Sutures    Suture size:  4-0    Suture material:  Nylon    Suture technique:   Simple interrupted    Number of sutures:  13  Approximation:     Approximation:  Close  Repair type:     Repair type:  Simple  Post-procedure details:     Dressing:  Antibiotic ointment, non-adherent dressing and splint for protection    Procedure completion:  Tolerated well, no immediate complications  Laceration Repair    Date/Time: 1/27/2025 11:48 PM    Performed by: Wen Razo PA  Authorized by: Edison Lund MD    Consent:     Consent obtained:  Verbal    Consent given by:  Patient    Risks, benefits, and alternatives were discussed: yes      Risks discussed:  Infection and pain  Universal protocol:     Procedure explained and questions answered to patient or proxy's satisfaction: yes      Imaging studies available: yes      Patient identity confirmed:  Verbally with patient  Anesthesia:     Anesthesia method:  Local infiltration    Local anesthetic:  Lidocaine 1% WITH epi  Laceration details:     Location:  Leg    Leg location:  R knee    Length (cm):  3  Pre-procedure details:     Preparation:  Patient was prepped and draped in usual sterile fashion and imaging obtained to evaluate for foreign bodies  Exploration:     Hemostasis achieved with:  Direct pressure and epinephrine    Imaging obtained: x-ray      Imaging outcome: foreign body not noted      Wound exploration: wound explored through full range of motion and entire depth of wound visualized      Wound extent: no foreign bodies/material noted and no underlying fracture noted    Treatment:     Area cleansed with:  Chlorhexidine and saline    Amount of cleaning:  Standard    Irrigation solution:  Sterile saline  Skin repair:     Repair method:  Sutures    Suture size:  4-0    Suture material:  Nylon    Suture technique:  Simple interrupted    Number of sutures:  4  Approximation:     Approximation:  Close  Repair type:     Repair type:  Simple  Post-procedure details:     Dressing:  Antibiotic ointment, non-adherent dressing and splint for  protection    Procedure completion:  Tolerated well, no immediate complications        MEDICATIONS GIVEN IN ER    Medications   Tetanus-Diphth-Acell Pertussis (BOOSTRIX) injection 0.5 mL (has no administration in time range)   lidocaine 1% - EPINEPHrine 1:[id number] (XYLOCAINE W/EPI) 1 %-1:[id number] injection 10 mL (has no administration in time range)         PROGRESS, DATA ANALYSIS, CONSULTS, AND MEDICAL DECISION MAKING    All labs have been independently reviewed and interpreted by me.  All radiology studies have been independently reviewed and interpreted by me and discussed with radiologist dictating the report.   EKG's independently reviewed and interpreted by me.  Discussion below represents my analysis of pertinent findings related to patient's condition, differential diagnosis, treatment plan and final disposition.    Differential diagnosis: laceration, tendon injury, fracture, dislocation    ED Course as of 01/27/25 2349   Mon Jan 27, 2025   2304 XR Knee 1 or 2 View Right  Radiology study independently interpreted by me and my findings are no fracture or retained fb.   [DC]   2316 XR Knee 1 or 2 View Right  My independent interpretation of the imaging study is no gross fracture [TR]      ED Course User Index  [DC] eWn Razo PA  [TR] Edison Lund MD             AS OF 23:49 EST VITALS:    BP - 140/64  HR - 105  TEMP - 97 °F (36.1 °C) (Tympanic)  O2 SATS - 93%        DIAGNOSIS  Final diagnoses:   Knee laceration, right, initial encounter         DISPOSITION  ED Disposition       ED Disposition   Discharge    Condition   Stable    Comment   --                  Note Disclaimer: At [facility], we believe that sharing information builds trust and better relationships. You are receiving this note because you recently visited [facility]. It is possible you will see health information before a provider has talked with you about it. This kind of information can be easy to misunderstand. To help you fully  understand what it means for your health, we urge you to discuss this note with your provider.         Wen Razo PA  01/27/25 8805

## 2025-01-28 NOTE — ED PROVIDER NOTES
EMERGENCY DEPARTMENT MD ATTESTATION NOTE    Room Number:  01/01  PCP: Lawanda Burgess MD  Independent Historians: Patient and Family    HPI:  A complete HPI/ROS/PMH/PSH/SH/FH are unobtainable due to: None    Chronic or social conditions impacting patient care (Social Determinants of Health): None      Context: Maribel Juan is a 64 y.o. female with a medical history of ITP, hyperlipidemia who presents to the ED c/o acute fall.  The patient reports she fell just prior to arrival.  She slipped on the ice.  She reports laceration to her right knee.  She states she has been able to ambulate on her knee.  She does not know when she last had a tetanus shot.  She did not hit her head.  She denies any other injury.        Review of prior external notes (non-ED) -and- Review of prior external test results outside of this encounter:  CBC dated 12/2/2024 was normal .  Creatinine on 12/2/2024 was 1.0    Prescription drug monitoring program review:           PHYSICAL EXAM    I have reviewed the triage vital signs and nursing notes.    ED Triage Vitals   Temp Heart Rate Resp BP SpO2   01/27/25 2146 01/27/25 2146 01/27/25 2146 01/27/25 2152 01/27/25 2146   97 °F (36.1 °C) 105 18 140/64 93 %      Temp src Heart Rate Source Patient Position BP Location FiO2 (%)   01/27/25 2146 01/27/25 2146 01/27/25 2152 01/27/25 2152 --   Tympanic Monitor Sitting Right arm        Physical Exam  GENERAL: Awake, alert, no acute distress  SKIN: Warm, dry  HENT: Normocephalic, atraumatic  EYES: no scleral icterus  CV: regular rhythm, regular rate  RESPIRATORY: normal effort, lungs clear  ABDOMEN: soft, nontender, nondistended  MUSCULOSKELETAL: no deformity.  Large laceration to the right knee.  Hemostatic.  No deep structures visible.  NEURO: alert, moves all extremities, follows commands            MEDICATIONS GIVEN IN ER  Medications   Tetanus-Diphth-Acell Pertussis (BOOSTRIX) injection 0.5 mL (0.5 mL Intramuscular Given 1/28/25 0000)    lidocaine 1% - EPINEPHrine 1:[id number] (XYLOCAINE W/EPI) 1 %-1:[id number] injection 10 mL (10 mL Injection Given 1/28/25 0001)         ORDERS PLACED DURING THIS VISIT:  Orders Placed This Encounter   Procedures    Laceration Repair    Laceration Repair    XR Knee 1 or 2 View Right         PROCEDURES  Procedures            PROGRESS, DATA ANALYSIS, CONSULTS, AND MEDICAL DECISION MAKING  All labs have been independently interpreted by me.  All radiology studies have been reviewed by me. All EKG's have been independently viewed and interpreted by me.  Discussion below represents my analysis of pertinent findings related to patient's condition, differential diagnosis, treatment plan and final disposition.    Differential diagnosis includes but is not limited to laceration, fracture, dislocation, retained foreign body.    Clinical Scores:                                     ED Course as of 01/28/25 0120   Mon Jan 27, 2025   2304 XR Knee 1 or 2 View Right  Radiology study independently interpreted by me and my findings are no fracture or retained fb.   [DC]   2316 XR Knee 1 or 2 View Right  My independent interpretation of the imaging study is no gross fracture [TR]      ED Course User Index  [DC] Wen Razo PA  [TR] Edison Lund MD       MDM: Plan to update the patient's tetanus status.  Will x-ray her right knee to rule out fracture and retained foreign body.  She will require wound closure.      COMPLEXITY OF CARE  Admission was considered but after careful review of the patient's presentation, physical examination, diagnostic results, and response to treatment the patient may be safely discharged with outpatient follow-up.    Please note that portions of this document were completed with a voice recognition program.    Note Disclaimer: At [facility], we believe that sharing information builds trust and better relationships. You are receiving this note because you recently visited [facility]. It is possible  you will see health information before a provider has talked with you about it. This kind of information can be easy to misunderstand. To help you fully understand what it means for your health, we urge you to discuss this note with your provider.         Edison Lund MD  01/27/25 2807       Edison Lund MD  01/28/25 [id number]

## 2025-01-30 ENCOUNTER — TRANSCRIBE ORDERS (OUTPATIENT)
Dept: ADMINISTRATIVE | Facility: HOSPITAL | Age: 65
End: 2025-01-30
Payer: MEDICARE

## 2025-01-30 DIAGNOSIS — J84.9 ILD (INTERSTITIAL LUNG DISEASE): Primary | ICD-10-CM

## 2025-02-24 ENCOUNTER — OFFICE (OUTPATIENT)
Dept: URBAN - METROPOLITAN AREA CLINIC 64 | Facility: CLINIC | Age: 65
End: 2025-02-24
Payer: OTHER GOVERNMENT

## 2025-02-24 VITALS
SYSTOLIC BLOOD PRESSURE: 134 MMHG | HEIGHT: 66 IN | WEIGHT: 182 LBS | HEART RATE: 97 BPM | DIASTOLIC BLOOD PRESSURE: 90 MMHG

## 2025-02-24 DIAGNOSIS — R74.8 ABNORMAL LEVELS OF OTHER SERUM ENZYMES: ICD-10-CM

## 2025-02-24 DIAGNOSIS — R76.8 OTHER SPECIFIED ABNORMAL IMMUNOLOGICAL FINDINGS IN SERUM: ICD-10-CM

## 2025-02-24 PROCEDURE — 99213 OFFICE O/P EST LOW 20 MIN: CPT | Performed by: NURSE PRACTITIONER

## 2025-05-27 ENCOUNTER — TRANSCRIBE ORDERS (OUTPATIENT)
Dept: ADMINISTRATIVE | Facility: HOSPITAL | Age: 65
End: 2025-05-27
Payer: MEDICARE

## 2025-05-27 ENCOUNTER — HOSPITAL ENCOUNTER (OUTPATIENT)
Facility: HOSPITAL | Age: 65
Discharge: HOME | End: 2025-05-27
Payer: MEDICARE

## 2025-05-27 ENCOUNTER — LAB (OUTPATIENT)
Facility: HOSPITAL | Age: 65
End: 2025-05-27
Payer: MEDICARE

## 2025-05-27 DIAGNOSIS — R76.8: ICD-10-CM

## 2025-05-27 DIAGNOSIS — R74.8 ELEVATED LIVER ENZYMES LEVEL DUE TO CYSTIC FIBROSIS: Primary | ICD-10-CM

## 2025-05-27 DIAGNOSIS — E84.8 ELEVATED LIVER ENZYMES LEVEL DUE TO CYSTIC FIBROSIS: Primary | ICD-10-CM

## 2025-05-27 DIAGNOSIS — R79.1 ABNORMAL PARTIAL THROMBOPLASTIN TIME (PTT): ICD-10-CM

## 2025-05-27 DIAGNOSIS — J84.9 ILD (INTERSTITIAL LUNG DISEASE): ICD-10-CM

## 2025-05-27 DIAGNOSIS — R74.8 ELEVATED LIVER ENZYMES LEVEL DUE TO CYSTIC FIBROSIS: ICD-10-CM

## 2025-05-27 DIAGNOSIS — R76.8 POSITIVE ANA (ANTINUCLEAR ANTIBODY): ICD-10-CM

## 2025-05-27 DIAGNOSIS — E84.8 ELEVATED LIVER ENZYMES LEVEL DUE TO CYSTIC FIBROSIS: ICD-10-CM

## 2025-05-27 LAB
ALBUMIN SERPL-MCNC: 3.9 G/DL (ref 3.5–5.2)
ALBUMIN/GLOB SERPL: 1.4 G/DL
ALP SERPL-CCNC: 110 U/L (ref 39–117)
ALT SERPL W P-5'-P-CCNC: 7 U/L (ref 1–33)
ANION GAP SERPL CALCULATED.3IONS-SCNC: 15.5 MMOL/L (ref 5–15)
AST SERPL-CCNC: 12 U/L (ref 1–32)
BILIRUB SERPL-MCNC: 0.3 MG/DL (ref 0–1.2)
BUN SERPL-MCNC: 13 MG/DL (ref 8–23)
BUN/CREAT SERPL: 12.9 (ref 7–25)
CALCIUM SPEC-SCNC: 9.7 MG/DL (ref 8.6–10.5)
CHLORIDE SERPL-SCNC: 103 MMOL/L (ref 98–107)
CO2 SERPL-SCNC: 22.5 MMOL/L (ref 22–29)
CREAT SERPL-MCNC: 1.01 MG/DL (ref 0.57–1)
DEPRECATED RDW RBC AUTO: 39 FL (ref 37–54)
EGFRCR SERPLBLD CKD-EPI 2021: 61.5 ML/MIN/1.73
ERYTHROCYTE [DISTWIDTH] IN BLOOD BY AUTOMATED COUNT: 13.3 % (ref 12.3–15.4)
GLOBULIN UR ELPH-MCNC: 2.7 GM/DL
GLUCOSE SERPL-MCNC: 131 MG/DL (ref 65–99)
HCT VFR BLD AUTO: 33.7 % (ref 34–46.6)
HGB BLD-MCNC: 11.2 G/DL (ref 12–15.9)
INR PPP: 1.06 (ref 0.9–1.1)
MCH RBC QN AUTO: 26.9 PG (ref 26.6–33)
MCHC RBC AUTO-ENTMCNC: 33.2 G/DL (ref 31.5–35.7)
MCV RBC AUTO: 80.8 FL (ref 79–97)
PLATELET # BLD AUTO: 294 10*3/MM3 (ref 140–450)
PMV BLD AUTO: 9.3 FL (ref 6–12)
POTASSIUM SERPL-SCNC: 4 MMOL/L (ref 3.5–5.2)
PROT SERPL-MCNC: 6.6 G/DL (ref 6–8.5)
PROTHROMBIN TIME: 13.7 SECONDS (ref 11.7–14.2)
RBC # BLD AUTO: 4.17 10*6/MM3 (ref 3.77–5.28)
SODIUM SERPL-SCNC: 141 MMOL/L (ref 136–145)
WBC NRBC COR # BLD AUTO: 6.61 10*3/MM3 (ref 3.4–10.8)

## 2025-05-27 PROCEDURE — 71250 CT THORAX DX C-: CPT

## 2025-05-27 PROCEDURE — 85027 COMPLETE CBC AUTOMATED: CPT

## 2025-05-27 PROCEDURE — 80053 COMPREHEN METABOLIC PANEL: CPT

## 2025-05-27 PROCEDURE — 85610 PROTHROMBIN TIME: CPT

## 2025-05-27 PROCEDURE — 36415 COLL VENOUS BLD VENIPUNCTURE: CPT

## 2025-06-11 ENCOUNTER — OFFICE (OUTPATIENT)
Dept: URBAN - METROPOLITAN AREA CLINIC 64 | Facility: CLINIC | Age: 65
End: 2025-06-11
Payer: MEDICARE

## 2025-06-11 VITALS
HEIGHT: 66 IN | SYSTOLIC BLOOD PRESSURE: 110 MMHG | HEART RATE: 92 BPM | DIASTOLIC BLOOD PRESSURE: 71 MMHG | WEIGHT: 181 LBS

## 2025-06-11 DIAGNOSIS — R94.5 ABNORMAL RESULTS OF LIVER FUNCTION STUDIES: ICD-10-CM

## 2025-06-11 DIAGNOSIS — R76.8 OTHER SPECIFIED ABNORMAL IMMUNOLOGICAL FINDINGS IN SERUM: ICD-10-CM

## 2025-06-11 PROCEDURE — 99214 OFFICE O/P EST MOD 30 MIN: CPT | Performed by: INTERNAL MEDICINE

## 2025-08-28 ENCOUNTER — OFFICE (OUTPATIENT)
Dept: URBAN - METROPOLITAN AREA CLINIC 64 | Facility: CLINIC | Age: 65
End: 2025-08-28
Payer: MEDICARE

## 2025-08-28 VITALS
SYSTOLIC BLOOD PRESSURE: 105 MMHG | HEART RATE: 87 BPM | HEIGHT: 66 IN | DIASTOLIC BLOOD PRESSURE: 73 MMHG | WEIGHT: 182 LBS

## 2025-08-28 DIAGNOSIS — R11.0 NAUSEA: ICD-10-CM

## 2025-08-28 DIAGNOSIS — R19.7 DIARRHEA, UNSPECIFIED: ICD-10-CM

## 2025-08-28 DIAGNOSIS — R10.9 UNSPECIFIED ABDOMINAL PAIN: ICD-10-CM

## 2025-08-28 PROCEDURE — 99214 OFFICE O/P EST MOD 30 MIN: CPT | Performed by: NURSE PRACTITIONER

## 2025-08-28 RX ORDER — DICYCLOMINE HYDROCHLORIDE 10 MG/1
40 CAPSULE ORAL
Qty: 60 | Refills: 11 | Status: ACTIVE
Start: 2025-08-28

## 2025-08-28 RX ORDER — SACCHAROMYCES BOULARDII 50 MG
500 CAPSULE ORAL
Qty: 60 | Refills: 11 | Status: ACTIVE
Start: 2025-08-28

## (undated) DEVICE — PK ENDO GI 50

## (undated) DEVICE — BITEBLOCK ENDO W/STRAP 60F A/ LF DISP

## (undated) DEVICE — ENDOSCOPIC ULTRASOUND ASPIRATION NEEDLE: Brand: EXPECT SLIMLINE SL